# Patient Record
Sex: FEMALE | Employment: FULL TIME | URBAN - METROPOLITAN AREA
[De-identification: names, ages, dates, MRNs, and addresses within clinical notes are randomized per-mention and may not be internally consistent; named-entity substitution may affect disease eponyms.]

---

## 2018-12-20 ENCOUNTER — ANESTHESIA EVENT (OUTPATIENT)
Dept: OPERATING ROOM | Age: 53
End: 2018-12-20
Payer: COMMERCIAL

## 2018-12-20 RX ORDER — MELOXICAM 15 MG/1
15 TABLET ORAL DAILY
COMMUNITY

## 2018-12-21 ENCOUNTER — HOSPITAL ENCOUNTER (OUTPATIENT)
Age: 53
Setting detail: OUTPATIENT SURGERY
Discharge: HOME | End: 2018-12-21
Attending: OBSTETRICS & GYNECOLOGY | Admitting: OBSTETRICS & GYNECOLOGY
Payer: COMMERCIAL

## 2018-12-21 ENCOUNTER — ANESTHESIA (OUTPATIENT)
Dept: OPERATING ROOM | Age: 53
End: 2018-12-21
Payer: COMMERCIAL

## 2018-12-21 VITALS
TEMPERATURE: 97.1 F | RESPIRATION RATE: 20 BRPM | SYSTOLIC BLOOD PRESSURE: 125 MMHG | WEIGHT: 156.97 LBS | HEART RATE: 92 BPM | HEIGHT: 63 IN | DIASTOLIC BLOOD PRESSURE: 73 MMHG | BODY MASS INDEX: 27.81 KG/M2 | OXYGEN SATURATION: 97 %

## 2018-12-21 VITALS
RESPIRATION RATE: 6 BRPM | TEMPERATURE: 97.7 F | OXYGEN SATURATION: 98 % | DIASTOLIC BLOOD PRESSURE: 55 MMHG | SYSTOLIC BLOOD PRESSURE: 105 MMHG

## 2018-12-21 DIAGNOSIS — G89.18 POST-OP PAIN: ICD-10-CM

## 2018-12-21 DIAGNOSIS — Z90.710 S/P HYSTERECTOMY: Primary | ICD-10-CM

## 2018-12-21 LAB
CA 125: 6.6 U/ML (ref 0–35)
CEA: 1.7 NG/ML (ref 0–5)

## 2018-12-21 PROCEDURE — 2580000003 HC RX 258: Performed by: OBSTETRICS & GYNECOLOGY

## 2018-12-21 PROCEDURE — 6360000002 HC RX W HCPCS: Performed by: NURSE ANESTHETIST, CERTIFIED REGISTERED

## 2018-12-21 PROCEDURE — 88305 TISSUE EXAM BY PATHOLOGIST: CPT

## 2018-12-21 PROCEDURE — 3700000000 HC ANESTHESIA ATTENDED CARE: Performed by: OBSTETRICS & GYNECOLOGY

## 2018-12-21 PROCEDURE — 2709999900 HC NON-CHARGEABLE SUPPLY: Performed by: OBSTETRICS & GYNECOLOGY

## 2018-12-21 PROCEDURE — 3600000015 HC SURGERY LEVEL 5 ADDTL 15MIN: Performed by: OBSTETRICS & GYNECOLOGY

## 2018-12-21 PROCEDURE — 3700000001 HC ADD 15 MINUTES (ANESTHESIA): Performed by: OBSTETRICS & GYNECOLOGY

## 2018-12-21 PROCEDURE — 6360000002 HC RX W HCPCS: Performed by: ANESTHESIOLOGY

## 2018-12-21 PROCEDURE — S0028 INJECTION, FAMOTIDINE, 20 MG: HCPCS | Performed by: ANESTHESIOLOGY

## 2018-12-21 PROCEDURE — 7100000000 HC PACU RECOVERY - FIRST 15 MIN: Performed by: OBSTETRICS & GYNECOLOGY

## 2018-12-21 PROCEDURE — 82378 CARCINOEMBRYONIC ANTIGEN: CPT

## 2018-12-21 PROCEDURE — 6360000002 HC RX W HCPCS: Performed by: OBSTETRICS & GYNECOLOGY

## 2018-12-21 PROCEDURE — 2500000003 HC RX 250 WO HCPCS: Performed by: NURSE ANESTHETIST, CERTIFIED REGISTERED

## 2018-12-21 PROCEDURE — 2500000003 HC RX 250 WO HCPCS: Performed by: ANESTHESIOLOGY

## 2018-12-21 PROCEDURE — 86301 IMMUNOASSAY TUMOR CA 19-9: CPT

## 2018-12-21 PROCEDURE — 88112 CYTOPATH CELL ENHANCE TECH: CPT

## 2018-12-21 PROCEDURE — 7100000011 HC PHASE II RECOVERY - ADDTL 15 MIN: Performed by: OBSTETRICS & GYNECOLOGY

## 2018-12-21 PROCEDURE — 2580000003 HC RX 258: Performed by: NURSE ANESTHETIST, CERTIFIED REGISTERED

## 2018-12-21 PROCEDURE — 86304 IMMUNOASSAY TUMOR CA 125: CPT

## 2018-12-21 PROCEDURE — 6370000000 HC RX 637 (ALT 250 FOR IP): Performed by: ANESTHESIOLOGY

## 2018-12-21 PROCEDURE — 88342 IMHCHEM/IMCYTCHM 1ST ANTB: CPT

## 2018-12-21 PROCEDURE — 7100000010 HC PHASE II RECOVERY - FIRST 15 MIN: Performed by: OBSTETRICS & GYNECOLOGY

## 2018-12-21 PROCEDURE — 7100000001 HC PACU RECOVERY - ADDTL 15 MIN: Performed by: OBSTETRICS & GYNECOLOGY

## 2018-12-21 PROCEDURE — 2580000003 HC RX 258: Performed by: ANESTHESIOLOGY

## 2018-12-21 PROCEDURE — 3600000005 HC SURGERY LEVEL 5 BASE: Performed by: OBSTETRICS & GYNECOLOGY

## 2018-12-21 PROCEDURE — 88331 PATH CONSLTJ SURG 1 BLK 1SPC: CPT

## 2018-12-21 PROCEDURE — 88341 IMHCHEM/IMCYTCHM EA ADD ANTB: CPT

## 2018-12-21 RX ORDER — MEPERIDINE HYDROCHLORIDE 25 MG/ML
12.5 INJECTION INTRAMUSCULAR; INTRAVENOUS; SUBCUTANEOUS EVERY 5 MIN PRN
Status: DISCONTINUED | OUTPATIENT
Start: 2018-12-21 | End: 2018-12-21 | Stop reason: HOSPADM

## 2018-12-21 RX ORDER — OXYCODONE HYDROCHLORIDE AND ACETAMINOPHEN 5; 325 MG/1; MG/1
2 TABLET ORAL PRN
Status: COMPLETED | OUTPATIENT
Start: 2018-12-21 | End: 2018-12-21

## 2018-12-21 RX ORDER — ROCURONIUM BROMIDE 10 MG/ML
INJECTION, SOLUTION INTRAVENOUS PRN
Status: DISCONTINUED | OUTPATIENT
Start: 2018-12-21 | End: 2018-12-21 | Stop reason: SDUPTHER

## 2018-12-21 RX ORDER — ESMOLOL HYDROCHLORIDE 10 MG/ML
INJECTION INTRAVENOUS PRN
Status: DISCONTINUED | OUTPATIENT
Start: 2018-12-21 | End: 2018-12-21 | Stop reason: SDUPTHER

## 2018-12-21 RX ORDER — PROPOFOL 10 MG/ML
INJECTION, EMULSION INTRAVENOUS PRN
Status: DISCONTINUED | OUTPATIENT
Start: 2018-12-21 | End: 2018-12-21 | Stop reason: SDUPTHER

## 2018-12-21 RX ORDER — OXYCODONE HYDROCHLORIDE AND ACETAMINOPHEN 5; 325 MG/1; MG/1
1 TABLET ORAL
Status: DISCONTINUED | OUTPATIENT
Start: 2018-12-21 | End: 2018-12-21 | Stop reason: HOSPADM

## 2018-12-21 RX ORDER — FENTANYL CITRATE 50 UG/ML
50 INJECTION, SOLUTION INTRAMUSCULAR; INTRAVENOUS EVERY 5 MIN PRN
Status: DISCONTINUED | OUTPATIENT
Start: 2018-12-21 | End: 2018-12-21 | Stop reason: HOSPADM

## 2018-12-21 RX ORDER — MORPHINE SULFATE 2 MG/ML
1 INJECTION, SOLUTION INTRAMUSCULAR; INTRAVENOUS EVERY 5 MIN PRN
Status: DISCONTINUED | OUTPATIENT
Start: 2018-12-21 | End: 2018-12-21 | Stop reason: HOSPADM

## 2018-12-21 RX ORDER — OXYCODONE HYDROCHLORIDE AND ACETAMINOPHEN 5; 325 MG/1; MG/1
1 TABLET ORAL PRN
Status: COMPLETED | OUTPATIENT
Start: 2018-12-21 | End: 2018-12-21

## 2018-12-21 RX ORDER — KETOROLAC TROMETHAMINE 30 MG/ML
INJECTION, SOLUTION INTRAMUSCULAR; INTRAVENOUS PRN
Status: DISCONTINUED | OUTPATIENT
Start: 2018-12-21 | End: 2018-12-21 | Stop reason: SDUPTHER

## 2018-12-21 RX ORDER — FENTANYL CITRATE 50 UG/ML
INJECTION, SOLUTION INTRAMUSCULAR; INTRAVENOUS PRN
Status: DISCONTINUED | OUTPATIENT
Start: 2018-12-21 | End: 2018-12-21 | Stop reason: SDUPTHER

## 2018-12-21 RX ORDER — SODIUM CHLORIDE, SODIUM LACTATE, POTASSIUM CHLORIDE, CALCIUM CHLORIDE 600; 310; 30; 20 MG/100ML; MG/100ML; MG/100ML; MG/100ML
INJECTION, SOLUTION INTRAVENOUS CONTINUOUS PRN
Status: DISCONTINUED | OUTPATIENT
Start: 2018-12-21 | End: 2018-12-21 | Stop reason: SDUPTHER

## 2018-12-21 RX ORDER — SODIUM CHLORIDE 0.9 % (FLUSH) 0.9 %
10 SYRINGE (ML) INJECTION PRN
Status: DISCONTINUED | OUTPATIENT
Start: 2018-12-21 | End: 2018-12-21 | Stop reason: HOSPADM

## 2018-12-21 RX ORDER — ONDANSETRON 2 MG/ML
4 INJECTION INTRAMUSCULAR; INTRAVENOUS
Status: DISCONTINUED | OUTPATIENT
Start: 2018-12-21 | End: 2018-12-21 | Stop reason: HOSPADM

## 2018-12-21 RX ORDER — HYDROMORPHONE HCL 110MG/55ML
PATIENT CONTROLLED ANALGESIA SYRINGE INTRAVENOUS PRN
Status: DISCONTINUED | OUTPATIENT
Start: 2018-12-21 | End: 2018-12-21 | Stop reason: SDUPTHER

## 2018-12-21 RX ORDER — SODIUM CHLORIDE 9 MG/ML
INJECTION, SOLUTION INTRAVENOUS CONTINUOUS
Status: DISCONTINUED | OUTPATIENT
Start: 2018-12-21 | End: 2018-12-21 | Stop reason: HOSPADM

## 2018-12-21 RX ORDER — MIDAZOLAM HYDROCHLORIDE 1 MG/ML
INJECTION INTRAMUSCULAR; INTRAVENOUS PRN
Status: DISCONTINUED | OUTPATIENT
Start: 2018-12-21 | End: 2018-12-21 | Stop reason: SDUPTHER

## 2018-12-21 RX ORDER — LIDOCAINE HYDROCHLORIDE 20 MG/ML
INJECTION, SOLUTION INFILTRATION; PERINEURAL PRN
Status: DISCONTINUED | OUTPATIENT
Start: 2018-12-21 | End: 2018-12-21 | Stop reason: SDUPTHER

## 2018-12-21 RX ORDER — MORPHINE SULFATE 2 MG/ML
2 INJECTION, SOLUTION INTRAMUSCULAR; INTRAVENOUS EVERY 5 MIN PRN
Status: DISCONTINUED | OUTPATIENT
Start: 2018-12-21 | End: 2018-12-21 | Stop reason: HOSPADM

## 2018-12-21 RX ORDER — OXYCODONE HYDROCHLORIDE AND ACETAMINOPHEN 5; 325 MG/1; MG/1
1 TABLET ORAL EVERY 6 HOURS PRN
Qty: 20 TABLET | Refills: 0 | Status: SHIPPED | OUTPATIENT
Start: 2018-12-21 | End: 2018-12-26

## 2018-12-21 RX ORDER — FENTANYL CITRATE 50 UG/ML
25 INJECTION, SOLUTION INTRAMUSCULAR; INTRAVENOUS EVERY 5 MIN PRN
Status: DISCONTINUED | OUTPATIENT
Start: 2018-12-21 | End: 2018-12-21 | Stop reason: HOSPADM

## 2018-12-21 RX ORDER — DEXAMETHASONE SODIUM PHOSPHATE 4 MG/ML
INJECTION, SOLUTION INTRA-ARTICULAR; INTRALESIONAL; INTRAMUSCULAR; INTRAVENOUS; SOFT TISSUE PRN
Status: DISCONTINUED | OUTPATIENT
Start: 2018-12-21 | End: 2018-12-21 | Stop reason: SDUPTHER

## 2018-12-21 RX ORDER — ONDANSETRON 2 MG/ML
INJECTION INTRAMUSCULAR; INTRAVENOUS PRN
Status: DISCONTINUED | OUTPATIENT
Start: 2018-12-21 | End: 2018-12-21 | Stop reason: SDUPTHER

## 2018-12-21 RX ORDER — SODIUM CHLORIDE 0.9 % (FLUSH) 0.9 %
10 SYRINGE (ML) INJECTION EVERY 12 HOURS SCHEDULED
Status: DISCONTINUED | OUTPATIENT
Start: 2018-12-21 | End: 2018-12-21 | Stop reason: HOSPADM

## 2018-12-21 RX ADMIN — ROCURONIUM BROMIDE 40 MG: 10 INJECTION INTRAVENOUS at 12:07

## 2018-12-21 RX ADMIN — HYDROMORPHONE HYDROCHLORIDE 0.5 MG: 2 INJECTION INTRAMUSCULAR; INTRAVENOUS; SUBCUTANEOUS at 12:25

## 2018-12-21 RX ADMIN — FENTANYL CITRATE 25 MCG: 50 INJECTION, SOLUTION INTRAMUSCULAR; INTRAVENOUS at 15:48

## 2018-12-21 RX ADMIN — PROPOFOL 40 MG: 10 INJECTION, EMULSION INTRAVENOUS at 14:15

## 2018-12-21 RX ADMIN — SODIUM CHLORIDE: 9 INJECTION, SOLUTION INTRAVENOUS at 10:45

## 2018-12-21 RX ADMIN — FENTANYL CITRATE 25 MCG: 50 INJECTION, SOLUTION INTRAMUSCULAR; INTRAVENOUS at 15:24

## 2018-12-21 RX ADMIN — FENTANYL CITRATE 25 MCG: 50 INJECTION, SOLUTION INTRAMUSCULAR; INTRAVENOUS at 15:56

## 2018-12-21 RX ADMIN — ONDANSETRON 4 MG: 2 INJECTION INTRAMUSCULAR; INTRAVENOUS at 13:54

## 2018-12-21 RX ADMIN — PROPOFOL 150 MG: 10 INJECTION, EMULSION INTRAVENOUS at 12:07

## 2018-12-21 RX ADMIN — OXYCODONE AND ACETAMINOPHEN 1 TABLET: 5; 325 TABLET ORAL at 16:44

## 2018-12-21 RX ADMIN — HYDROMORPHONE HYDROCHLORIDE 0.5 MG: 2 INJECTION INTRAMUSCULAR; INTRAVENOUS; SUBCUTANEOUS at 12:35

## 2018-12-21 RX ADMIN — ESMOLOL HYDROCHLORIDE 10 MG: 100 INJECTION, SOLUTION INTRAVENOUS at 12:27

## 2018-12-21 RX ADMIN — SUGAMMADEX 150 MG: 100 INJECTION, SOLUTION INTRAVENOUS at 14:09

## 2018-12-21 RX ADMIN — MIDAZOLAM 2 MG: 1 INJECTION INTRAMUSCULAR; INTRAVENOUS at 11:58

## 2018-12-21 RX ADMIN — FENTANYL CITRATE 100 MCG: 50 INJECTION INTRAMUSCULAR; INTRAVENOUS at 12:04

## 2018-12-21 RX ADMIN — FAMOTIDINE 20 MG: 10 INJECTION, SOLUTION INTRAVENOUS at 10:51

## 2018-12-21 RX ADMIN — DEXAMETHASONE SODIUM PHOSPHATE 8 MG: 4 INJECTION, SOLUTION INTRAMUSCULAR; INTRAVENOUS at 12:25

## 2018-12-21 RX ADMIN — KETOROLAC TROMETHAMINE 30 MG: 30 INJECTION, SOLUTION INTRAMUSCULAR at 13:56

## 2018-12-21 RX ADMIN — SODIUM CHLORIDE, SODIUM LACTATE, POTASSIUM CHLORIDE, AND CALCIUM CHLORIDE: .6; .31; .03; .02 INJECTION, SOLUTION INTRAVENOUS at 13:19

## 2018-12-21 RX ADMIN — PROPOFOL 20 MG: 10 INJECTION, EMULSION INTRAVENOUS at 14:10

## 2018-12-21 RX ADMIN — LIDOCAINE HYDROCHLORIDE 80 MG: 20 INJECTION, SOLUTION INFILTRATION; PERINEURAL at 12:07

## 2018-12-21 RX ADMIN — CEFOXITIN SODIUM 2 G: 2 POWDER, FOR SOLUTION INTRAVENOUS at 11:58

## 2018-12-21 ASSESSMENT — PULMONARY FUNCTION TESTS
PIF_VALUE: 23
PIF_VALUE: 15
PIF_VALUE: 2
PIF_VALUE: 2
PIF_VALUE: 24
PIF_VALUE: 22
PIF_VALUE: 23
PIF_VALUE: 24
PIF_VALUE: 20
PIF_VALUE: 24
PIF_VALUE: 24
PIF_VALUE: 21
PIF_VALUE: 20
PIF_VALUE: 24
PIF_VALUE: 24
PIF_VALUE: 20
PIF_VALUE: 25
PIF_VALUE: 22
PIF_VALUE: 23
PIF_VALUE: 0
PIF_VALUE: 23
PIF_VALUE: 21
PIF_VALUE: 25
PIF_VALUE: 0
PIF_VALUE: 25
PIF_VALUE: 25
PIF_VALUE: 2
PIF_VALUE: 5
PIF_VALUE: 21
PIF_VALUE: 14
PIF_VALUE: 22
PIF_VALUE: 14
PIF_VALUE: 22
PIF_VALUE: 13
PIF_VALUE: 2
PIF_VALUE: 25
PIF_VALUE: 24
PIF_VALUE: 2
PIF_VALUE: 23
PIF_VALUE: 14
PIF_VALUE: 24
PIF_VALUE: 15
PIF_VALUE: 20
PIF_VALUE: 23
PIF_VALUE: 22
PIF_VALUE: 2
PIF_VALUE: 15
PIF_VALUE: 20
PIF_VALUE: 23
PIF_VALUE: 2
PIF_VALUE: 2
PIF_VALUE: 20
PIF_VALUE: 2
PIF_VALUE: 24
PIF_VALUE: 2
PIF_VALUE: 25
PIF_VALUE: 26
PIF_VALUE: 24
PIF_VALUE: 23
PIF_VALUE: 20
PIF_VALUE: 23
PIF_VALUE: 25
PIF_VALUE: 24
PIF_VALUE: 20
PIF_VALUE: 19
PIF_VALUE: 20
PIF_VALUE: 20
PIF_VALUE: 25
PIF_VALUE: 24
PIF_VALUE: 18
PIF_VALUE: 20
PIF_VALUE: 2
PIF_VALUE: 24
PIF_VALUE: 14
PIF_VALUE: 21
PIF_VALUE: 5
PIF_VALUE: 7
PIF_VALUE: 24
PIF_VALUE: 14
PIF_VALUE: 25
PIF_VALUE: 20
PIF_VALUE: 19
PIF_VALUE: 25
PIF_VALUE: 23
PIF_VALUE: 20
PIF_VALUE: 16
PIF_VALUE: 25
PIF_VALUE: 15
PIF_VALUE: 24
PIF_VALUE: 15
PIF_VALUE: 25
PIF_VALUE: 23
PIF_VALUE: 25
PIF_VALUE: 20
PIF_VALUE: 25
PIF_VALUE: 14
PIF_VALUE: 24
PIF_VALUE: 25
PIF_VALUE: 23
PIF_VALUE: 12
PIF_VALUE: 23
PIF_VALUE: 2
PIF_VALUE: 22
PIF_VALUE: 18
PIF_VALUE: 14
PIF_VALUE: 23
PIF_VALUE: 0
PIF_VALUE: 22
PIF_VALUE: 20
PIF_VALUE: 3
PIF_VALUE: 1
PIF_VALUE: 24
PIF_VALUE: 2
PIF_VALUE: 25
PIF_VALUE: 25
PIF_VALUE: 22
PIF_VALUE: 25
PIF_VALUE: 25
PIF_VALUE: 2
PIF_VALUE: 23
PIF_VALUE: 25
PIF_VALUE: 23
PIF_VALUE: 13
PIF_VALUE: 23
PIF_VALUE: 20
PIF_VALUE: 15
PIF_VALUE: 14
PIF_VALUE: 15
PIF_VALUE: 14
PIF_VALUE: 13
PIF_VALUE: 25
PIF_VALUE: 20
PIF_VALUE: 25
PIF_VALUE: 23
PIF_VALUE: 20
PIF_VALUE: 25
PIF_VALUE: 14
PIF_VALUE: 21
PIF_VALUE: 25
PIF_VALUE: 13
PIF_VALUE: 0
PIF_VALUE: 25
PIF_VALUE: 21
PIF_VALUE: 23

## 2018-12-21 ASSESSMENT — PAIN DESCRIPTION - PAIN TYPE
TYPE: SURGICAL PAIN

## 2018-12-21 ASSESSMENT — PAIN SCALES - GENERAL
PAINLEVEL_OUTOF10: 5
PAINLEVEL_OUTOF10: 6
PAINLEVEL_OUTOF10: 4
PAINLEVEL_OUTOF10: 5
PAINLEVEL_OUTOF10: 6
PAINLEVEL_OUTOF10: 6

## 2018-12-21 ASSESSMENT — PAIN - FUNCTIONAL ASSESSMENT: PAIN_FUNCTIONAL_ASSESSMENT: 0-10

## 2018-12-21 NOTE — PROGRESS NOTES
Vaginal Sweep Documentation     Intraop skin prep sponge count correct, verified by SA IKER and Vy Estrada RN. Vaginal sweep performed by DR. RAMIREZ at [phone]. No foreign objects or vaginal tears noted.

## 2018-12-21 NOTE — PROGRESS NOTES
Patient states pain level 6 of 10 and medicated with Fentanyl 25mcg IV per order. Taking few ice chips prn. Patient denies C/O nausea. Patients  updated in family lounge.

## 2018-12-21 NOTE — PROGRESS NOTES
Went over discharge instructions. Appears to understand. No increase in drainage to abdominal dressings.

## 2018-12-21 NOTE — ANESTHESIA POSTPROCEDURE EVALUATION
Department of Anesthesiology  Postprocedure Note    Patient: Erasmo Grossman  MRN: [id number]  YOB: 1965  Date of evaluation: 12/21/2018  Time:  2:50 PM     Procedure Summary     Date:  12/21/18 Room / Location:  [facility] OR [address] [place] [address] OR    Anesthesia Start:  1200 Anesthesia Stop:  4680    Procedures:       PERITONEAL BIOPSY      PARTIAL OMENTECTOMY Diagnosis:  (CYSTOCELE N81.11, OCCULT INCONTINENCE R32)    Surgeon:  Makenzie Johnson MD Responsible Provider:  Bandar Moe MD    Anesthesia Type:  general ASA Status:  2          Anesthesia Type: general    Aurea Phase I: Aurea Score: 5    Aurea Phase II:      Last vitals: Reviewed and per EMR flowsheets.        Anesthesia Post Evaluation    Patient location during evaluation: PACU  Patient participation: complete - patient participated  Level of consciousness: awake and alert  Pain score: 0  Airway patency: patent  Nausea & Vomiting: no nausea and no vomiting  Complications: no  Cardiovascular status: blood pressure returned to baseline  Respiratory status: acceptable  Hydration status: euvolemic

## 2018-12-21 NOTE — PROGRESS NOTES
States pain level 8/10 on her right side of abdomen. States will wait till she goes home to take a pain pill.

## 2018-12-21 NOTE — PROGRESS NOTES
Patient admitted to PACU from OR. Patient asleep. Resp easy unlabored on 3LNC with SaO2 100%. Abdominal surgical dressings x 5 dry and intact. Abdomen soft. VSS. IV patent to right hand.

## 2018-12-21 NOTE — PROGRESS NOTES
Had ambulated to the bathroom with assistnace to void. Too well. Uo qs. Returned to room. Sitting up in chair. Warmed blanket over pt. Sipping [place] soda.

## 2018-12-22 LAB — CA 19-9: 13 U/ML (ref 0–35)

## 2018-12-23 NOTE — OP NOTE
[address] [place] [address]                                OPERATIVE REPORT    PATIENT NAME: Kim Patel                      :        1965  MED REC NO:   [id number]                          ROOM:  ACCOUNT NO:   [de-identified]                           ADMIT DATE: 2018  PROVIDER:     Asher Brannon MD    DATE OF PROCEDURE:  2018    PREOPERATIVE DIAGNOSES:  Cystocele and occult stress urinary  incontinence. POSTOPERATIVE DIAGNOSES:  Cystocele and occult stress urinary  incontinence. OPERATIONS PERFORMED:  Da Miranda-assisted laparoscopy, right  salpingo-oophorectomy, pelvic washings, peritoneal biopsies, followed by  Dr. Beau Puente performing omentectomy and small bowel mesentery biopsies and  epiploica biopsy. SURGEON:  Asher Brannon MD    ANESTHESIA:  General.    COMPLICATIONS:  None. EBL:  Minimal.    INDICATIONS:  This is a 56-year-old white female who has a history of  total vaginal hysterectomy in the past for bleeding and now presents  with a symptomatic cystocele and was found on exam to have occult stress  urinary incontinence as well. After discussion of the options, the  patient desired da Miranda sacrocolpopexy and TVT for treatment. The  risks, benefits, indications, and alternatives of this procedure were  discussed with the patient, and all questions were answered. PROCEDURE:  The patient was taken to the operating room with IV running. General anesthesia was obtained. The patient was prepped and draped in  the usual sterile fashion in the dorsal lithotomy position. The EEA  sizer was placed in the vagina for manipulation. A Hilliard catheter was  placed. Attention was turned to the abdomen where a 47-FG supraumbilical  incision was made with a scalpel. The Veress needle was placed into the  incision and into the peritoneal cavity.   Intraperitoneal placement was  documented

## 2019-01-11 ENCOUNTER — HOSPITAL ENCOUNTER (OUTPATIENT)
Dept: CT IMAGING | Age: 54
Discharge: HOME | End: 2019-01-11
Payer: COMMERCIAL

## 2019-01-11 DIAGNOSIS — R19.00 ABDOMINAL MASS, UNSPECIFIED ABDOMINAL LOCATION: ICD-10-CM

## 2019-01-11 PROCEDURE — 6360000004 HC RX CONTRAST MEDICATION: Performed by: OBSTETRICS & GYNECOLOGY

## 2019-01-11 PROCEDURE — 74178 CT ABD&PLV WO CNTR FLWD CNTR: CPT

## 2019-01-11 RX ADMIN — IOHEXOL 50 ML: 240 INJECTION, SOLUTION INTRATHECAL; INTRAVASCULAR; INTRAVENOUS; ORAL at 16:22

## 2019-01-11 RX ADMIN — IOPAMIDOL 75 ML: 755 INJECTION, SOLUTION INTRAVENOUS at 16:22

## 2021-06-15 ENCOUNTER — CLINICAL DOCUMENTATION (OUTPATIENT)
Dept: OTHER | Age: 56
End: 2021-06-15

## 2024-05-28 RX ORDER — M-VIT,TX,IRON,MINS/CALC/FOLIC 27MG-0.4MG
1 TABLET ORAL DAILY
COMMUNITY

## 2024-05-28 NOTE — PROGRESS NOTES
[facility] PRE-OPERATIVE INSTRUCTIONS    Day of Procedure: 6/7               Arrival time:     710           Surgery time:910    Take the following medications with a sip of water:  Follow your MD/Surgeons pre-procedure instructions regarding your medications     Do not eat or drink anything after 12:00 midnight prior to your surgery.  This includes water chewing gum, mints and ice chips.   You may brush your teeth and gargle the morning of your surgery, but do not swallow the water     Please see your family doctor/pediatrician for a history and physical and/or concerning medications.   Bring any test results/reports from your physicians office.   If you are under the care of a heart doctor or specialist doctor, please be aware that you may be asked to them for clearance    You may be asked to stop blood thinners such as Coumadin, Plavix, Fragmin, Lovenox, etc., or any anti-inflammatories such as:  Aspirin, Ibuprofen, Advil, Naproxen prior to your surgery.    We also ask that you stop any OTC medications such as fish oil, vitamin E, glucosamine, garlic, Multivitamins, COQ 10, etc.    We ask that you do not smoke 24 hours prior to surgery  We ask that you do not  drink any alcoholic beverages 24 hours prior to surgery     You must make arrangements for a responsible adult to take you home after your surgery.    For your safety you will not be allowed to leave alone or drive yourself home.  Your surgery will be cancelled if you do not have a ride home.     Also for your safety, it is strongly suggested that someone stay with you the first 24 hours after your surgery.     A parent or legal guardian must accompany a child scheduled for surgery and plan to stay at the hospital until the child is discharged.    Please do not bring other children with you.    For your comfort, please wear simple loose fitting clothing to the hospital.  Please do not bring valuables.    Do not wear any make-up or nail polish

## 2024-05-28 NOTE — PROGRESS NOTES
[facility] Pre-Admission Testing Electronic Communication Worksheet for OR/ENDO Procedures        Patient: Eloise Grijalva    DOS: 6/7    Transportation Confirmed: [x] YES    []  NO    History and Physical:  [] YES per terrence   []  NO  [] N/A  If yes, please list doctor or Urgent Care and date of H&P:     Additional Clearance(Cardiac, Pulmonary, etc):  [] YES    [x]  NO    Pre-Admission Testing Visit:  [] YES    []  NO If no, do labs/testing need to be done DOS?  [] YES    []  NO    Medication Reconciliation Complete:  [x] YES    []  NO        Additional Notes:                Interview Complete: [x] YES    []  NO          Sudha Stroud RN  12:28 PM

## 2024-05-28 NOTE — PROGRESS NOTES
Follow Up Prior to Surgery    DOS:   :1965      History and Physical:  Missing Orders:  Pt to see Dr. Ramirez on      UPDATE:: ORDERS RECEIVED AND PLACED IN [facility]. H&P BY DR. RAMIREZ SCANNED IN MEDIA

## 2024-06-06 ENCOUNTER — ANESTHESIA EVENT (OUTPATIENT)
Dept: OPERATING ROOM | Age: 59
End: 2024-06-06
Payer: COMMERCIAL

## 2024-06-07 ENCOUNTER — HOSPITAL ENCOUNTER (OUTPATIENT)
Age: 59
Setting detail: OUTPATIENT SURGERY
Discharge: HOME | End: 2024-06-07
Attending: OBSTETRICS & GYNECOLOGY | Admitting: OBSTETRICS & GYNECOLOGY
Payer: COMMERCIAL

## 2024-06-07 ENCOUNTER — ANESTHESIA (OUTPATIENT)
Dept: OPERATING ROOM | Age: 59
End: 2024-06-07
Payer: COMMERCIAL

## 2024-06-07 VITALS
DIASTOLIC BLOOD PRESSURE: 60 MMHG | OXYGEN SATURATION: 92 % | SYSTOLIC BLOOD PRESSURE: 114 MMHG | WEIGHT: 165.2 LBS | HEIGHT: 63 IN | HEART RATE: 95 BPM | BODY MASS INDEX: 29.27 KG/M2 | RESPIRATION RATE: 16 BRPM | TEMPERATURE: 97 F

## 2024-06-07 DIAGNOSIS — G89.18 POSTOPERATIVE PAIN: Primary | ICD-10-CM

## 2024-06-07 DIAGNOSIS — N81.10 CYSTOCELE, UNSPECIFIED: ICD-10-CM

## 2024-06-07 DIAGNOSIS — N83.202 OVARIAN CYST, LEFT: ICD-10-CM

## 2024-06-07 PROCEDURE — 2709999900 HC NON-CHARGEABLE SUPPLY: Performed by: OBSTETRICS & GYNECOLOGY

## 2024-06-07 PROCEDURE — 7100000001 HC PACU RECOVERY - ADDTL 15 MIN: Performed by: OBSTETRICS & GYNECOLOGY

## 2024-06-07 PROCEDURE — 3600000019 HC SURGERY ROBOT ADDTL 15MIN: Performed by: OBSTETRICS & GYNECOLOGY

## 2024-06-07 PROCEDURE — 7100000010 HC PHASE II RECOVERY - FIRST 15 MIN: Performed by: OBSTETRICS & GYNECOLOGY

## 2024-06-07 PROCEDURE — 7100000000 HC PACU RECOVERY - FIRST 15 MIN: Performed by: OBSTETRICS & GYNECOLOGY

## 2024-06-07 PROCEDURE — 3700000001 HC ADD 15 MINUTES (ANESTHESIA): Performed by: OBSTETRICS & GYNECOLOGY

## 2024-06-07 PROCEDURE — 2500000003 HC RX 250 WO HCPCS: Performed by: OBSTETRICS & GYNECOLOGY

## 2024-06-07 PROCEDURE — 6370000000 HC RX 637 (ALT 250 FOR IP): Performed by: ANESTHESIOLOGY

## 2024-06-07 PROCEDURE — A4217 STERILE WATER/SALINE, 500 ML: HCPCS | Performed by: OBSTETRICS & GYNECOLOGY

## 2024-06-07 PROCEDURE — 3700000000 HC ANESTHESIA ATTENDED CARE: Performed by: OBSTETRICS & GYNECOLOGY

## 2024-06-07 PROCEDURE — 3600000009 HC SURGERY ROBOT BASE: Performed by: OBSTETRICS & GYNECOLOGY

## 2024-06-07 PROCEDURE — 6360000002 HC RX W HCPCS: Performed by: NURSE ANESTHETIST, CERTIFIED REGISTERED

## 2024-06-07 PROCEDURE — 88305 TISSUE EXAM BY PATHOLOGIST: CPT

## 2024-06-07 PROCEDURE — 7100000011 HC PHASE II RECOVERY - ADDTL 15 MIN: Performed by: OBSTETRICS & GYNECOLOGY

## 2024-06-07 PROCEDURE — 2580000003 HC RX 258: Performed by: OBSTETRICS & GYNECOLOGY

## 2024-06-07 PROCEDURE — 2580000003 HC RX 258: Performed by: ANESTHESIOLOGY

## 2024-06-07 PROCEDURE — S2900 ROBOTIC SURGICAL SYSTEM: HCPCS | Performed by: OBSTETRICS & GYNECOLOGY

## 2024-06-07 PROCEDURE — 2580000003 HC RX 258: Performed by: NURSE ANESTHETIST, CERTIFIED REGISTERED

## 2024-06-07 PROCEDURE — 6360000002 HC RX W HCPCS: Performed by: OBSTETRICS & GYNECOLOGY

## 2024-06-07 PROCEDURE — 2500000003 HC RX 250 WO HCPCS: Performed by: NURSE ANESTHETIST, CERTIFIED REGISTERED

## 2024-06-07 PROCEDURE — 6360000002 HC RX W HCPCS: Performed by: ANESTHESIOLOGY

## 2024-06-07 RX ORDER — FENTANYL CITRATE 50 UG/ML
INJECTION, SOLUTION INTRAMUSCULAR; INTRAVENOUS PRN
Status: DISCONTINUED | OUTPATIENT
Start: 2024-06-07 | End: 2024-06-07 | Stop reason: SDUPTHER

## 2024-06-07 RX ORDER — LIDOCAINE HYDROCHLORIDE 20 MG/ML
INJECTION, SOLUTION EPIDURAL; INFILTRATION; INTRACAUDAL; PERINEURAL PRN
Status: DISCONTINUED | OUTPATIENT
Start: 2024-06-07 | End: 2024-06-07 | Stop reason: SDUPTHER

## 2024-06-07 RX ORDER — ONDANSETRON 2 MG/ML
INJECTION INTRAMUSCULAR; INTRAVENOUS PRN
Status: DISCONTINUED | OUTPATIENT
Start: 2024-06-07 | End: 2024-06-07 | Stop reason: SDUPTHER

## 2024-06-07 RX ORDER — MAGNESIUM HYDROXIDE 1200 MG/15ML
LIQUID ORAL CONTINUOUS PRN
Status: COMPLETED | OUTPATIENT
Start: 2024-06-07 | End: 2024-06-07

## 2024-06-07 RX ORDER — ROCURONIUM BROMIDE 10 MG/ML
INJECTION, SOLUTION INTRAVENOUS PRN
Status: DISCONTINUED | OUTPATIENT
Start: 2024-06-07 | End: 2024-06-07 | Stop reason: SDUPTHER

## 2024-06-07 RX ORDER — SODIUM CHLORIDE 0.9 % (FLUSH) 0.9 %
5-40 SYRINGE (ML) INJECTION EVERY 12 HOURS SCHEDULED
Status: DISCONTINUED | OUTPATIENT
Start: 2024-06-07 | End: 2024-06-07 | Stop reason: HOSPADM

## 2024-06-07 RX ORDER — MIDAZOLAM HYDROCHLORIDE 1 MG/ML
INJECTION INTRAMUSCULAR; INTRAVENOUS PRN
Status: DISCONTINUED | OUTPATIENT
Start: 2024-06-07 | End: 2024-06-07 | Stop reason: SDUPTHER

## 2024-06-07 RX ORDER — NALOXONE HYDROCHLORIDE 0.4 MG/ML
INJECTION, SOLUTION INTRAMUSCULAR; INTRAVENOUS; SUBCUTANEOUS PRN
Status: DISCONTINUED | OUTPATIENT
Start: 2024-06-07 | End: 2024-06-07 | Stop reason: HOSPADM

## 2024-06-07 RX ORDER — ONDANSETRON 2 MG/ML
4 INJECTION INTRAMUSCULAR; INTRAVENOUS
Status: DISCONTINUED | OUTPATIENT
Start: 2024-06-07 | End: 2024-06-07 | Stop reason: HOSPADM

## 2024-06-07 RX ORDER — SODIUM CHLORIDE 9 MG/ML
INJECTION, SOLUTION INTRAVENOUS PRN
Status: DISCONTINUED | OUTPATIENT
Start: 2024-06-07 | End: 2024-06-07 | Stop reason: HOSPADM

## 2024-06-07 RX ORDER — OXYCODONE HYDROCHLORIDE AND ACETAMINOPHEN 5; 325 MG/1; MG/1
1 TABLET ORAL
Status: COMPLETED | OUTPATIENT
Start: 2024-06-07 | End: 2024-06-07

## 2024-06-07 RX ORDER — LIDOCAINE HYDROCHLORIDE AND EPINEPHRINE 10; 10 MG/ML; UG/ML
INJECTION, SOLUTION INFILTRATION; PERINEURAL
Status: COMPLETED | OUTPATIENT
Start: 2024-06-07 | End: 2024-06-07

## 2024-06-07 RX ORDER — SODIUM CHLORIDE 9 MG/ML
INJECTION, SOLUTION INTRAVENOUS CONTINUOUS PRN
Status: DISCONTINUED | OUTPATIENT
Start: 2024-06-07 | End: 2024-06-07 | Stop reason: SDUPTHER

## 2024-06-07 RX ORDER — KETOROLAC TROMETHAMINE 30 MG/ML
INJECTION, SOLUTION INTRAMUSCULAR; INTRAVENOUS PRN
Status: DISCONTINUED | OUTPATIENT
Start: 2024-06-07 | End: 2024-06-07 | Stop reason: SDUPTHER

## 2024-06-07 RX ORDER — SODIUM CHLORIDE 0.9 % (FLUSH) 0.9 %
5-40 SYRINGE (ML) INJECTION PRN
Status: DISCONTINUED | OUTPATIENT
Start: 2024-06-07 | End: 2024-06-07 | Stop reason: HOSPADM

## 2024-06-07 RX ORDER — OXYCODONE HYDROCHLORIDE AND ACETAMINOPHEN 5; 325 MG/1; MG/1
2 TABLET ORAL
Status: COMPLETED | OUTPATIENT
Start: 2024-06-07 | End: 2024-06-07

## 2024-06-07 RX ORDER — PHENYLEPHRINE HCL IN 0.9% NACL 1 MG/10 ML
SYRINGE (ML) INTRAVENOUS PRN
Status: DISCONTINUED | OUTPATIENT
Start: 2024-06-07 | End: 2024-06-07 | Stop reason: SDUPTHER

## 2024-06-07 RX ORDER — DEXAMETHASONE SODIUM PHOSPHATE 4 MG/ML
INJECTION, SOLUTION INTRA-ARTICULAR; INTRALESIONAL; INTRAMUSCULAR; INTRAVENOUS; SOFT TISSUE PRN
Status: DISCONTINUED | OUTPATIENT
Start: 2024-06-07 | End: 2024-06-07 | Stop reason: SDUPTHER

## 2024-06-07 RX ORDER — FENTANYL CITRATE 0.05 MG/ML
25 INJECTION, SOLUTION INTRAMUSCULAR; INTRAVENOUS EVERY 5 MIN PRN
Status: DISCONTINUED | OUTPATIENT
Start: 2024-06-07 | End: 2024-06-07 | Stop reason: HOSPADM

## 2024-06-07 RX ORDER — PROPOFOL 10 MG/ML
INJECTION, EMULSION INTRAVENOUS PRN
Status: DISCONTINUED | OUTPATIENT
Start: 2024-06-07 | End: 2024-06-07 | Stop reason: SDUPTHER

## 2024-06-07 RX ORDER — OXYCODONE HYDROCHLORIDE AND ACETAMINOPHEN 5; 325 MG/1; MG/1
1 TABLET ORAL EVERY 6 HOURS PRN
Qty: 12 TABLET | Refills: 0 | Status: SHIPPED | OUTPATIENT
Start: 2024-06-07 | End: 2024-06-10

## 2024-06-07 RX ADMIN — ONDANSETRON 4 MG: 2 INJECTION INTRAMUSCULAR; INTRAVENOUS at 10:10

## 2024-06-07 RX ADMIN — Medication 100 MCG: at 09:52

## 2024-06-07 RX ADMIN — SUGAMMADEX 100 MG: 100 INJECTION, SOLUTION INTRAVENOUS at 10:23

## 2024-06-07 RX ADMIN — DEXAMETHASONE SODIUM PHOSPHATE 8 MG: 4 INJECTION, SOLUTION INTRAMUSCULAR; INTRAVENOUS at 09:40

## 2024-06-07 RX ADMIN — SODIUM CHLORIDE: 9 INJECTION, SOLUTION INTRAVENOUS at 08:18

## 2024-06-07 RX ADMIN — CEFTRIAXONE 2000 MG: 2 INJECTION, POWDER, FOR SOLUTION INTRAMUSCULAR; INTRAVENOUS at 09:25

## 2024-06-07 RX ADMIN — SUGAMMADEX 100 MG: 100 INJECTION, SOLUTION INTRAVENOUS at 10:25

## 2024-06-07 RX ADMIN — SODIUM CHLORIDE: 9 INJECTION, SOLUTION INTRAVENOUS at 09:25

## 2024-06-07 RX ADMIN — Medication 100 MCG: at 09:48

## 2024-06-07 RX ADMIN — PROPOFOL 200 MG: 10 INJECTION, EMULSION INTRAVENOUS at 09:29

## 2024-06-07 RX ADMIN — LIDOCAINE HYDROCHLORIDE 50 MG: 20 INJECTION, SOLUTION EPIDURAL; INFILTRATION; INTRACAUDAL; PERINEURAL at 09:29

## 2024-06-07 RX ADMIN — ROCURONIUM BROMIDE 50 MG: 10 SOLUTION INTRAVENOUS at 09:29

## 2024-06-07 RX ADMIN — MIDAZOLAM 1 MG: 1 INJECTION INTRAMUSCULAR; INTRAVENOUS at 09:25

## 2024-06-07 RX ADMIN — OXYCODONE HYDROCHLORIDE AND ACETAMINOPHEN 1 TABLET: 5; 325 TABLET ORAL at 12:48

## 2024-06-07 RX ADMIN — HYDROMORPHONE HYDROCHLORIDE 0.5 MG: 1 INJECTION, SOLUTION INTRAMUSCULAR; INTRAVENOUS; SUBCUTANEOUS at 10:59

## 2024-06-07 RX ADMIN — FENTANYL CITRATE 100 MCG: 50 INJECTION INTRAMUSCULAR; INTRAVENOUS at 09:27

## 2024-06-07 RX ADMIN — KETOROLAC TROMETHAMINE 30 MG: 30 INJECTION, SOLUTION INTRAMUSCULAR at 10:35

## 2024-06-07 RX ADMIN — HYDROMORPHONE HYDROCHLORIDE 0.5 MG: 1 INJECTION, SOLUTION INTRAMUSCULAR; INTRAVENOUS; SUBCUTANEOUS at 10:50

## 2024-06-07 RX ADMIN — SUGAMMADEX 100 MG: 100 INJECTION, SOLUTION INTRAVENOUS at 10:26

## 2024-06-07 ASSESSMENT — PAIN DESCRIPTION - ONSET
ONSET: ON-GOING

## 2024-06-07 ASSESSMENT — PAIN DESCRIPTION - FREQUENCY
FREQUENCY: CONTINUOUS

## 2024-06-07 ASSESSMENT — PAIN DESCRIPTION - DESCRIPTORS
DESCRIPTORS: PRESSURE
DESCRIPTORS: PRESSURE;ACHING

## 2024-06-07 ASSESSMENT — PAIN SCALES - GENERAL
PAINLEVEL_OUTOF10: 0
PAINLEVEL_OUTOF10: 4
PAINLEVEL_OUTOF10: 6
PAINLEVEL_OUTOF10: 4
PAINLEVEL_OUTOF10: 8

## 2024-06-07 ASSESSMENT — PAIN DESCRIPTION - PAIN TYPE
TYPE: SURGICAL PAIN

## 2024-06-07 ASSESSMENT — PAIN - FUNCTIONAL ASSESSMENT
PAIN_FUNCTIONAL_ASSESSMENT: PREVENTS OR INTERFERES SOME ACTIVE ACTIVITIES AND ADLS
PAIN_FUNCTIONAL_ASSESSMENT: 0-10
PAIN_FUNCTIONAL_ASSESSMENT: PREVENTS OR INTERFERES SOME ACTIVE ACTIVITIES AND ADLS
PAIN_FUNCTIONAL_ASSESSMENT: PREVENTS OR INTERFERES SOME ACTIVE ACTIVITIES AND ADLS
PAIN_FUNCTIONAL_ASSESSMENT: 0-10

## 2024-06-07 ASSESSMENT — PAIN DESCRIPTION - LOCATION
LOCATION: ABDOMEN;VAGINA
LOCATION: ABDOMEN;VAGINA
LOCATION: ABDOMEN
LOCATION: ABDOMEN

## 2024-06-07 ASSESSMENT — PAIN DESCRIPTION - ORIENTATION
ORIENTATION: MID

## 2024-06-07 NOTE — PROGRESS NOTES
Vaginal Sweep Documentation     Vaginal prep sponge count performed by Yaneth LIN and Lorelei PIERSON. Count correct.   Vaginal sweep performed by Dr Barrera at 1032am. No foreign objects or vaginal tears noted.

## 2024-06-07 NOTE — ANESTHESIA POSTPROCEDURE EVALUATION
Department of Anesthesiology  Postprocedure Note    Patient: Eloise Grijalva  MRN: [id number]  YOB: 1965  Date of evaluation: 6/7/2024    Procedure Summary       Date: 06/07/24 Room / Location: [address]    Anesthesia Start: 0925 Anesthesia Stop: 1045    Procedures:       ROBOTIC ASSISTED LAPAROSCOPIC LEFT SALPINGO OOPHORECTOMY, (Left: Uterus)      ANTERIOR COLPORRHAPHY (Vagina ) Diagnosis:       Ovarian cyst, left      Cystocele, unspecified      (Ovarian cyst, left [N83.202])      (Cystocele, unspecified [N81.10])    Surgeons: Samaria Barrera MD Responsible Provider: Boom Lugo MD    Anesthesia Type: general ASA Status: 2            Anesthesia Type: No value filed.    Aurea Phase I: Aurea Score: 10    Aurea Phase II:      Anesthesia Post Evaluation    Patient location during evaluation: PACU  Level of consciousness: awake and alert  Airway patency: patent  Nausea & Vomiting: no nausea and no vomiting  Cardiovascular status: blood pressure returned to baseline  Respiratory status: acceptable  Hydration status: euvolemic  Comments: Postoperative Anesthesia Note    Name:    Eloise Grijalva  MRN:      [id number]    Patient Vitals in the past 12 hrs:  06/07/24 1128, BP:119/69, Temp:97.5 °F (36.4 °C), Pulse:94, Resp:14, SpO2:94 %  06/07/24 1115, BP:115/70, Pulse:92, Resp:22, SpO2:94 %  06/07/24 1100, BP:(!) 105/58, Pulse:89, Resp:14, SpO2:93 %  06/07/24 1055, BP:111/65, Pulse:87, Resp:12, SpO2:95 %  06/07/24 1050, BP:116/63, Pulse:92, Resp:15, SpO2:100 %  06/07/24 1045, BP:123/65, Pulse:87, Resp:17, SpO2:100 %  06/07/24 1043, BP:125/84, Temp:98 °F (36.7 °C), Pulse:87, Resp:15, SpO2:100 %  06/07/24 0759, BP:138/83, Temp:97.7 °F (36.5 °C), Temp src:Temporal, Pulse:79, Resp:16, SpO2:97 %, Height:1.6 m (5' 3\"), Weight:74.9 kg (165 lb 3.2 oz)     LABS:    CBC  No results found for: \"WBC\", \"HGB\", \"HCT\", \"PLT\"  RENAL  No results found for: \"NA\", \"K\", \"CL\", \"CO2\",

## 2024-06-07 NOTE — PROGRESS NOTES
Patient continues to be drowsy, awakens easy. Pt up to chair and tolerating well. Daughter at bedside. Pain pill given for pain. Tolerating po will monitor.

## 2024-06-07 NOTE — OP NOTE
Operative Note      Patient: Eloise Grijalva  YOB: 1965  MRN: [id number]    Date of Procedure: 6/7/2024    Pre-Op Diagnosis Codes:     * Ovarian cyst, left [N83.202]     * Cystocele, unspecified [N81.10]    Post-Op Diagnosis: Same       Procedure(s):  ROBOTIC ASSISTED LAPAROSCOPIC LEFT SALPINGO OOPHORECTOMY,  ANTERIOR COLPORRHAPHY    Surgeon(s):  Samaria Barrera MD    Assistant:   Surgical Assistant: Kaz Baumann; Bautista Anthony    Anesthesia: General    Estimated Blood Loss (mL): less than 100     Complications: None    Specimens:   ID Type Source Tests Collected by Time Destination   A : A. LEFT ovary and LEFT fallopian tube Tissue Tissue SURGICAL PATHOLOGY Samaria Barrera MD 6/7/2024 1009        Implants:  * No implants in log *      Drains:   [REMOVED] Urinary Catheter 06/07/24 2 Way (Removed)       Findings:  Infection Present At Time Of Surgery (PATOS) (choose all levels that have infection present):  No infection present  Other Findings: left ovarian cyst    Detailed Description of Procedure:     INDICATIONS:  This is a 58 yo female who is s/p TLH and RSO in the past. She is found to have a 14 cm cyst on her left ovary, which is persistant. The risks, benefits, indications, and alternatives of a da Miranda LSO were discussed with the patient.  All questions were answered.     OPERATIVE PROCEDURE:  The patient was taken to the operating room with  IV running.  General anesthesia was obtained.  The patient was prepped  and draped in the usual sterile fashion in the dorsal lithotomy  position.  A sponge stick was placed into the vagina.   Attention was turned to the abdomen where a 8-mm supraumbilical  incision was made with a scalpel.  A Veress needle was placed in  through the incision into the peritoneal cavity.  Intraperitoneal  placement was documented using saline drop test and low pressures upon  insufflation of CO2 gas.  When approximately 3 L of CO2 gas was  insufflated,

## 2024-06-07 NOTE — ANESTHESIA PRE PROCEDURE
[facility] Department of Anesthesiology  Pre-Anesthesia Evaluation/Consultation       Name:  Eloise Grijalva  : 1965  Age:  59 y.o.                                           MRN:  [id number]  Date: 2024           Surgeon: Surgeon(s):  Samaria Barrera MD    Procedure: Procedure(s):  ROBOTIC ASSISTED LAPAROSCOPIC LEFT SALPINGO OOPHORECTOMY,  ANTERIOR COLPORRHAPHY     No Known Allergies  There is no problem list on file for this patient.    Past Medical History:   Diagnosis Date    Arthritis     Cystocele with rectocele     Stress incontinence      Past Surgical History:   Procedure Laterality Date    APPENDECTOMY      BREAST SURGERY Right     Benign    DILATION AND CURETTAGE OF UTERUS      ENDOMETRIAL ABLATION      HYSTERECTOMY (CERVIX STATUS UNKNOWN)      OMENTUM RESECTION  2018    PARTIAL OMENTECTOMY performed by Samaria Barrera MD at [facility] OR    PERITONEAL BIOPSY  2018    PERITONEAL BIOPSY performed by Samaria Barrera MD at [facility] OR    TOTAL KNEE ARTHROPLASTY Bilateral 2018     Social History     Tobacco Use    Smoking status: Never    Smokeless tobacco: Never   Vaping Use    Vaping Use: Never used   Substance Use Topics    Alcohol use: Yes     Comment: Occ    Drug use: No     Medications  No current facility-administered medications on file prior to encounter.     Current Outpatient Medications on File Prior to Encounter   Medication Sig Dispense Refill    Multiple Vitamins-Minerals (THERAPEUTIC MULTIVITAMIN-MINERALS) tablet Take 1 tablet by mouth daily       Current Facility-Administered Medications   Medication Dose Route Frequency Provider Last Rate Last Admin    sodium chloride flush 0.9 % injection 5-40 mL  5-40 mL IntraVENous 2 times per day Giovanni Rodrigues MD        sodium chloride flush 0.9 % injection 5-40 mL  5-40 mL IntraVENous PRN Giovanni Rodrigues MD        0.9 % sodium chloride infusion   IntraVENous PRN Giovanni Rodrigues

## 2024-06-07 NOTE — PROGRESS NOTES
PACU Transfer to [facility]    Vitals:    06/07/24 1128   BP: 119/69   Pulse: 94   Resp: 14   Temp: 97.5 °F (36.4 °C)   SpO2: 94%         Intake/Output Summary (Last 24 hours) at 6/7/2024 1131  Last data filed at 6/7/2024 1040  Gross per 24 hour   Intake 800 ml   Output 100 ml   Net 700 ml       Pain assessment:  receiving treatment  Pain Level: 6    Patient transferred to care of [facility] RN. Patient agreeable to go to phase two and take oral pain medication at this time      6/7/2024 11:31 AM

## 2024-06-07 NOTE — PROGRESS NOTES
Patient admitted to PACU # 4 from OR at 1043 post robotic assisted laparoscopic left salpingo-oophorectomy per MD Barrera.  Attached to PACU monitoring system and report received from anesthesia provider.  Patient was reported to be hemodynamically stable during procedure.  Patient drowsy on admission and denied pain. Dressings are clean, dry, and intact. No drainage noted from vagina. Packing in place per OR. Will continue to monitor.

## 2024-06-07 NOTE — DISCHARGE INSTRUCTIONS
[facility]  [address]  [place], [place]  [id number] [phone]    Dr. Samaria Barrera M.D.          Dr. Helen Crawford M.D.   Phone [phone]  Fax [phone] [address] [place]  [address]  [email or web address]    PATIENT NAME: Eloise Grijalva  MEDICAL RECORD NUMBER:  [id number]  TODAY'S DATE: @ED@       Discharge Instructions - Post op Laparoscopy    Activity -   You may drive 48 hours after surgery IF off pain medication  Stairs, lifting, exercise and showering is ok when you feel up to it    Vaginal -   Tub baths, tampons, douching, and intercourse is OK after 2 weeks    Bleeding -   Some clotting and light bleeding is to be expected   Call the office if you are changing more than 3 pads per hour    Shoulder Pain - Lie flat or hold a pillow to your abdomen and rock in a rocking chair     Incision -   Expect some bruising around the incision and on abdomen  Remove dressing or steri strips on post operative day #4  Wash with antimicrobial soap (Dial) and use a blow dryer on cool setting  Leave your incision open to air when possible  Clear, pinkish drainage is OK    Diet - Regular diet    Bowels - You may use Milk of Magnesia, Colace, Miralax, glycerin suppositories or a Fleets enema     Pain Medication - Use prescriptions as ordered, Tylenol or Advil is OK                Call the office:  If your temperature is above 101 degrees  If you have any swelling, redness, or tenderness of your lower legs  If you have an increase in redness or tenderness at incision site  Call the office to make a 2 week post-op appointment      Vaginal packing to be removed tomorrow by patient.

## (undated) DEVICE — GOWN SIRUS NONREIN LG W/TWL: Brand: MEDLINE INDUSTRIES, INC.

## (undated) DEVICE — MASTISOL ADHESIVE LIQ 2/3ML

## (undated) DEVICE — 3M™ STERI-STRIP™ REINFORCED ADHESIVE SKIN CLOSURES, R1546, 1/4 IN X 4 IN (6 MM X 100 MM), 10 STRIPS/ENVELOPE: Brand: 3M™ STERI-STRIP™

## (undated) DEVICE — TROCAR ENDOSCP L100MM DIA12MM BLDELSS OBT RADLUC STBL SL

## (undated) DEVICE — GLOVE SURG SZ 7 L12IN FNGR THK87MIL WHT LTX FREE

## (undated) DEVICE — SUTURE VCRL SZ 3-0 L27IN ABSRB UD L36MM CT-1 1/2 CIR J258H

## (undated) DEVICE — TIP COVER ACCESSORY

## (undated) DEVICE — SUTURE ABSORBABLE BRAIDED 2-0 CT-1 27 IN UD VICRYL J259H

## (undated) DEVICE — SOLUTION IRRIG 1000ML 0.9% SOD CHL USP POUR PLAS BTL

## (undated) DEVICE — SUTURE VCRL SZ 2-0 L18IN ABSRB UD CT-1 L36MM 1/2 CIR J839D

## (undated) DEVICE — COVER,TABLE,44X90,STERILE: Brand: MEDLINE

## (undated) DEVICE — PAD,NON-ADHERENT,3X8,STERILE,LF,1/PK: Brand: MEDLINE

## (undated) DEVICE — CATHETER F BLLN 5CC 16FR 2 W HYDRGEL COAT LESS TRAUM LUB

## (undated) DEVICE — MINOR LITHOTOMY: Brand: MEDLINE INDUSTRIES, INC.

## (undated) DEVICE — SOLUTION PREP POVIDONE IOD FOR SKIN MUCOUS MEM PRIOR TO

## (undated) DEVICE — 1840 FOAM BLOCK NEEDLE COUNTER: Brand: DEVON

## (undated) DEVICE — JELLY LUBRICATING 2.7GM H2O SOL GREASELESS E-Z

## (undated) DEVICE — GOWN,SIRUS,POLYRNF,BRTHSLV,XL,30/CS: Brand: MEDLINE

## (undated) DEVICE — LAPAROSCOPY PK

## (undated) DEVICE — SOLUTION ANTIFOG VIS SYS CLEARIFY LAPSCP

## (undated) DEVICE — TISSUE RETRIEVAL SYSTEM: Brand: INZII RETRIEVAL SYSTEM

## (undated) DEVICE — [HIGH FLOW INSUFFLATOR,  DO NOT USE IF PACKAGE IS DAMAGED,  KEEP DRY,  KEEP AWAY FROM SUNLIGHT,  PROTECT FROM HEAT AND RADIOACTIVE SOURCES.]: Brand: PNEUMOSURE

## (undated) DEVICE — PUMP SUC IRR TBNG L10FT W/ HNDPC ASSEMB STRYKEFLOW 2

## (undated) DEVICE — BAG SPEC REM 224ML W4XL6IN DIA10MM 1 HND GYN DISP ENDOPCH

## (undated) DEVICE — STRIP,CLOSURE,WOUND,MEDI-STRIP,1/2X4: Brand: MEDLINE

## (undated) DEVICE — SYRINGE IRRIG 60ML SFT PLIABLE BLB EZ TO GRP 1 HND USE W/

## (undated) DEVICE — SYRINGE MED 10ML LUERLOCK TIP W/O SFTY DISP

## (undated) DEVICE — PAD SANITARY MTRN TAB BELT WRP NS 11IN

## (undated) DEVICE — INSUFFLATION NEEDLE TO ESTABLISH PNEUMOPERITONEUM.: Brand: INSUFFLATION NEEDLE

## (undated) DEVICE — 3M™ TEGADERM™ TRANSPARENT FILM DRESSING FRAME STYLE, 1624W, 2-3/8 IN X 2-3/4 IN (6 CM X 7 CM), 100/CT 4CT/CASE: Brand: 3M™ TEGADERM™

## (undated) DEVICE — LIQUIBAND RAPID ADHESIVE 36/CS 0.8ML: Brand: MEDLINE

## (undated) DEVICE — CURITY PLAIN PACKING STRIP: Brand: CURITY

## (undated) DEVICE — PMI DISPOSABLE PUNCTURE CLOSURE DEVICE / SUTURE GRASPER: Brand: PMI

## (undated) DEVICE — ROBOTIC GYN: Brand: MEDLINE INDUSTRIES, INC.

## (undated) DEVICE — NEEDLE HYPO 23GA L1.5IN TURQ POLYPR HUB S STL THN WALL IM

## (undated) DEVICE — CYSTO/BLADDER IRRIGATION SET, REGULATING CLAMP

## (undated) DEVICE — STERILE SURGICAL LUBRICANT, METAL TUBE: Brand: SURGILUBE

## (undated) DEVICE — CATHETER,FOLEY,COUDE,LATEX,18FR,10ML: Brand: MEDLINE

## (undated) DEVICE — GAUZE,SPONGE,2"X2",8PLY,STERILE,LF,2'S: Brand: MEDLINE

## (undated) DEVICE — CORD ES L10FT MPLR LAP

## (undated) DEVICE — PAD PT POS 36 IN SURGYPAD DISP

## (undated) DEVICE — BAG RETRIEVAL SPECIMEN SUPERBAG 5 SMALL NYLON ITRODUCER

## (undated) DEVICE — BLADE CLIPPER GEN PURP NS

## (undated) DEVICE — TRAP,MUCUS SPECIMEN, 80CC: Brand: MEDLINE

## (undated) DEVICE — SUTURE VCRL SZ 4-0 L18IN ABSRB UD L19MM PS-2 3/8 CIR PRIM J496H

## (undated) DEVICE — Y-TYPE TUR/BLADDER IRRIGATION SET, REGULATING CLAMP

## (undated) DEVICE — ELECTRODE PT RET AD L9FT HI MOIST COND ADH HYDRGEL CORDED

## (undated) DEVICE — GOWN SIRUS NONREIN XL W/TWL: Brand: MEDLINE INDUSTRIES, INC.

## (undated) DEVICE — MINOR SET UP PK

## (undated) DEVICE — DRAINBAG,ANTI-REFLUX TOWER,L/F,2000ML,LL: Brand: MEDLINE

## (undated) DEVICE — DEVICE SECUREMENT AD W/ TRICOT ANCHR PD FOR F LTX SIL CATH

## (undated) DEVICE — TOWEL,OR,DSP,ST,BLUE,STD,4/PK,20PK/CS: Brand: MEDLINE

## (undated) DEVICE — 3M™ STERI-STRIP™ COMPOUND BENZOIN TINCTURE 40 BAGS/CARTON 4 CARTONS/CASE C1544: Brand: 3M™ STERI-STRIP™

## (undated) DEVICE — DRAPE LAP 104X35X124IN BLU CTRL + FAB REINF ABD FEN

## (undated) DEVICE — 1LYRTR 16FR10ML100%SIL UMS SNP: Brand: MEDLINE INDUSTRIES, INC.

## (undated) DEVICE — NEEDLE INSUF L120MM DIA2MM DISP FOR PNEUMOPERI ENDOPATH

## (undated) DEVICE — APPLICATOR SURG XL L38CM FOR ARISTA ABSRB HEMSTAT FLEXITIP

## (undated) DEVICE — DRAPE,UNDERBUTTOCKS,PCH,STERILE: Brand: MEDLINE

## (undated) DEVICE — CHLORAPREP 26ML ORANGE

## (undated) DEVICE — TRI-LUMEN FILTERED TUBE SET WITH ACTIVATED CHARCOAL FILTER: Brand: AIRSEAL

## (undated) DEVICE — Device

## (undated) DEVICE — SOLUTION SCRB 4OZ 10% POVIDONE IOD ANTIMIC BTL

## (undated) DEVICE — COVER LT HNDL BLU PLAS

## (undated) DEVICE — NEEDLE SPNL 22GA L3.5IN BLK HUB S STL REG WALL FIT STYL

## (undated) DEVICE — SEAL

## (undated) DEVICE — PACK,LAPARASCOPY,PELVISCOPY,AURORA: Brand: MEDLINE

## (undated) DEVICE — APPLICATOR ENDOSCP L34CM W/ S STL CANN PLAS OBT STYL FOR

## (undated) DEVICE — UNDERPAD INCONT XL MESH PROTCT + DISP FOR MAT USE

## (undated) DEVICE — CANISTER, RIGID, 1200CC: Brand: MEDLINE INDUSTRIES, INC.

## (undated) DEVICE — HYPODERMIC SAFETY NEEDLE: Brand: MONOJECT

## (undated) DEVICE — DRAPE,REIN 53X77,STERILE: Brand: MEDLINE

## (undated) DEVICE — SOLUTION IV IRRIG POUR BRL 0.9% SODIUM CHL 2F7124

## (undated) DEVICE — SUTURE ABSORBABLE L 18 IN SZ 4-0 NDL L 19 MM POLYGLACTIN 910 36/BX

## (undated) DEVICE — SUTURE SZ 0 27IN 5/8 CIR UR-6  TAPER PT VIOLET ABSRB VICRYL J603H

## (undated) DEVICE — DECANTER BTL 6IN: Brand: MEDLINE INDUSTRIES, INC.

## (undated) DEVICE — COVER,MAYO STAND,STERILE: Brand: MEDLINE

## (undated) DEVICE — BANDAGE GZ W2XL75IN ST RAYON POLY CNFRM STRTCH LTWT

## (undated) DEVICE — INVIEW CLEAR LEGGINGS: Brand: CONVERTORS

## (undated) DEVICE — BLADELESS OBTURATOR: Brand: WECK VISTA

## (undated) DEVICE — MERCY HEALTH WEST TURNOVER: Brand: MEDLINE INDUSTRIES, INC.

## (undated) DEVICE — KIT OR ROOM TURNOVER W/STRAP

## (undated) DEVICE — SUTURE VICRYL + SZ 3-0 L36IN ABSRB UD L36MM CT-1 1/2 CIR VCP944H

## (undated) DEVICE — GAUZE,PACKING STRIP,IODOFORM,1"X5YD,STRL: Brand: CURAD

## (undated) DEVICE — KIT DRP 4 ARM ACC DISP DA VINCI SI ENDOWRIST

## (undated) DEVICE — SUTURE VICRYL + SZ 0 L27IN ABSRB WHT CT-2 1/2 CIR TAPERPOINT VCP270H

## (undated) DEVICE — AIRSEAL 8 MM ACCESS PORT AND LOW PROFILE OBTURATOR WITH BLADELESS OPTICAL TIP, 120 MM LENGTH: Brand: AIRSEAL

## (undated) DEVICE — GARMENT COMPR STD FOR 17IN CALF UNIF THER FLOTRN

## (undated) DEVICE — OBTURATOR ROBOTIC DIA8MM BLDELSS ENDOSCP DISP DA VINCI SI

## (undated) DEVICE — GLOVE SURG SZ 75 L12IN FNGR THK87MIL WHT LTX FREE

## (undated) DEVICE — SKIN AFFIX SURG ADHESIVE 72/CS 0.55ML: Brand: MEDLINE

## (undated) DEVICE — Z INACTIVE USE 2635503 SOLUTION IRRIG 3000ML ST H2O USP UROMATIC PLAS CONT

## (undated) DEVICE — SYSTEM SMK EVAC LAP TBNG FILTER HSNG BENT STYL PNK SEE CLR

## (undated) DEVICE — Z DISCONTINUED BY MEDLINE USE 2711682 TRAY SKIN PREP DRY W/ PREM GLV

## (undated) DEVICE — ELECTRO LUBE IS A SINGLE PATIENT USE DEVICE THAT IS INTENDED TO BE USED ON ELECTROSURGICAL ELECTRODES TO REDUCE STICKING.: Brand: KEY SURGICAL ELECTRO LUBE

## (undated) DEVICE — ARM DRAPE

## (undated) DEVICE — TUBING, SUCTION, 1/4" X 12', STRAIGHT: Brand: MEDLINE

## (undated) DEVICE — SUTURE GOR TX SZ 2-0 L36IN NONABSORBABLE L18MM TH-18 1/2 3N04B

## (undated) DEVICE — CATHETER URETH 18FR 2CC BLLN SIL ELASTMR F 2 W BARDX

## (undated) DEVICE — PENCIL ES L3M BTTN SWCH S STL HEX LOK BLDE ELECTRD HOLSTER